# Patient Record
Sex: MALE | Race: AMERICAN INDIAN OR ALASKA NATIVE
[De-identification: names, ages, dates, MRNs, and addresses within clinical notes are randomized per-mention and may not be internally consistent; named-entity substitution may affect disease eponyms.]

---

## 2019-12-07 ENCOUNTER — HOSPITAL ENCOUNTER (EMERGENCY)
Dept: HOSPITAL 76 - ED | Age: 34
Discharge: HOME | End: 2019-12-07
Payer: COMMERCIAL

## 2019-12-07 VITALS — SYSTOLIC BLOOD PRESSURE: 136 MMHG | DIASTOLIC BLOOD PRESSURE: 89 MMHG

## 2019-12-07 DIAGNOSIS — K29.00: Primary | ICD-10-CM

## 2019-12-07 DIAGNOSIS — R79.89: ICD-10-CM

## 2019-12-07 LAB
ALBUMIN DIAFP-MCNC: 5.3 G/DL (ref 3.2–5.5)
ALBUMIN/GLOB SERPL: 1.6 {RATIO} (ref 1–2.2)
ALP SERPL-CCNC: 114 IU/L (ref 42–121)
ALT SERPL W P-5'-P-CCNC: 16 IU/L (ref 10–60)
ANION GAP SERPL CALCULATED.4IONS-SCNC: 10 MMOL/L (ref 6–13)
AST SERPL W P-5'-P-CCNC: 17 IU/L (ref 10–42)
BASOPHILS NFR BLD AUTO: 0.1 10^3/UL (ref 0–0.1)
BASOPHILS NFR BLD AUTO: 0.9 %
BILIRUB BLD-MCNC: 3.3 MG/DL (ref 0.2–1)
BILIRUB BLD-MCNC: 3.4 MG/DL (ref 0.2–1)
BILIRUB DIRECT SERPL-MCNC: 0.2 MG/DL (ref 0.1–0.5)
BUN SERPL-MCNC: 9 MG/DL (ref 6–20)
CALCIUM UR-MCNC: 9.9 MG/DL (ref 8.5–10.3)
CHLORIDE SERPL-SCNC: 99 MMOL/L (ref 101–111)
CLARITY UR REFRACT.AUTO: CLEAR
CO2 SERPL-SCNC: 29 MMOL/L (ref 21–32)
CREAT SERPLBLD-SCNC: 1.1 MG/DL (ref 0.6–1.2)
EOSINOPHIL # BLD AUTO: 0 10^3/UL (ref 0–0.7)
EOSINOPHIL NFR BLD AUTO: 0.3 %
ERYTHROCYTE [DISTWIDTH] IN BLOOD BY AUTOMATED COUNT: 12 % (ref 12–15)
GFRSERPLBLD MDRD-ARVRAT: 77 ML/MIN/{1.73_M2} (ref 89–?)
GLOBULIN SER-MCNC: 3.3 G/DL (ref 2.1–4.2)
GLUCOSE SERPL-MCNC: 91 MG/DL (ref 70–100)
GLUCOSE UR QL STRIP.AUTO: NEGATIVE MG/DL
HGB UR QL STRIP: 14.7 G/DL (ref 14–18)
KETONES UR QL STRIP.AUTO: NEGATIVE MG/DL
LIPASE SERPL-CCNC: 32 U/L (ref 22–51)
LYMPHOCYTES # SPEC AUTO: 1.6 10^3/UL (ref 1.5–3.5)
LYMPHOCYTES NFR BLD AUTO: 24.3 %
MCH RBC QN AUTO: 29.1 PG (ref 27–31)
MCHC RBC AUTO-ENTMCNC: 34.1 G/DL (ref 32–36)
MCV RBC AUTO: 85.2 FL (ref 80–94)
MONOCYTES # BLD AUTO: 0.5 10^3/UL (ref 0–1)
MONOCYTES NFR BLD AUTO: 7.9 %
NEUTROPHILS # BLD AUTO: 4.4 10^3/UL (ref 1.5–6.6)
NEUTROPHILS # SNV AUTO: 6.7 X10^3/UL (ref 4.8–10.8)
NEUTROPHILS NFR BLD AUTO: 66.3 %
NITRITE UR QL STRIP.AUTO: NEGATIVE
PDW BLD AUTO: 8.9 FL (ref 7.4–11.4)
PH UR STRIP.AUTO: 6.5 PH (ref 5–7.5)
PLATELET # BLD: 231 10^3/UL (ref 130–450)
PROT SPEC-MCNC: 8.6 G/DL (ref 6.7–8.2)
PROT UR STRIP.AUTO-MCNC: NEGATIVE MG/DL
RBC # UR STRIP.AUTO: NEGATIVE /UL
RBC MAR: 5.06 10^6/UL (ref 4.7–6.1)
SODIUM SERPLBLD-SCNC: 138 MMOL/L (ref 135–145)
SP GR UR STRIP.AUTO: <=1.005 (ref 1–1.03)
UROBILINOGEN UR QL STRIP.AUTO: (no result) E.U./DL
UROBILINOGEN UR STRIP.AUTO-MCNC: NEGATIVE MG/DL

## 2019-12-07 PROCEDURE — 85025 COMPLETE CBC W/AUTO DIFF WBC: CPT

## 2019-12-07 PROCEDURE — 80053 COMPREHEN METABOLIC PANEL: CPT

## 2019-12-07 PROCEDURE — 82248 BILIRUBIN DIRECT: CPT

## 2019-12-07 PROCEDURE — 81001 URINALYSIS AUTO W/SCOPE: CPT

## 2019-12-07 PROCEDURE — 76705 ECHO EXAM OF ABDOMEN: CPT

## 2019-12-07 PROCEDURE — 36415 COLL VENOUS BLD VENIPUNCTURE: CPT

## 2019-12-07 PROCEDURE — 83690 ASSAY OF LIPASE: CPT

## 2019-12-07 PROCEDURE — 81003 URINALYSIS AUTO W/O SCOPE: CPT

## 2019-12-07 PROCEDURE — 82247 BILIRUBIN TOTAL: CPT

## 2019-12-07 PROCEDURE — 87086 URINE CULTURE/COLONY COUNT: CPT

## 2019-12-07 PROCEDURE — 99284 EMERGENCY DEPT VISIT MOD MDM: CPT

## 2019-12-07 NOTE — ED PHYSICIAN DOCUMENTATION
PD HPI ABD PAIN





- Stated complaint


Stated Complaint: SIDE/ABD/BACK PX





- Chief complaint


Chief Complaint: Abd Pain





- History obtained from


History obtained from: Patient





- History of Present Illness


Timing - onset: How many days ago


Timing - duration: Days (Patient has had a week or 2 of intermittent pains with 

eating in the right upper quadrant and epigastric area.  This been more 

consistent over the last 3 to 4 days.  He was seen by his primary care and 

prescribed pantoprazole which he is taken for 3 days now.  He has not noticed an

improvement.  The pain is in the right upper quadrant and epigastric area and 

worse with eating.  He has not tried other medicines.  He has noticed some light

brown and floating stools.  He has changed to diet for more fruits and vegetable

s and higher fiber and away from greasy and fatty foods over the last week.  He 

has noticed a little bit of weight loss over the week as well.  He has not had 

any blood or melena in his stool.)


Quality: Cramping, Aching


Location: RUQ, Epigastric


Radiation: Right flank


Improved by: Laying still.  No: BM


Worsened by: Eating, Moving, Palpation.  No: Breathing


Associated symptoms: Nausea, Loss of appetite, Weight loss.  No: Fever, 

Vomiting, Diarrhea, Constipation, Melena, Dysuria


Similar symptoms before: Has not had sx before


Recently seen: Clinic (several days ago with Rx Pantoprazole.)





Review of Systems


Constitutional: reports: Fatigue.  denies: Fever, Chills, Myalgias


Nose: denies: Rhinorrhea / runny nose, Congestion


Throat: denies: Sore throat


Respiratory: denies: Dyspnea


GI: reports: Abdominal Pain, Nausea.  denies: Abdominal Swelling, Vomiting, D

iarrhea


: denies: Dysuria, Frequency


Musculoskeletal: denies: Neck pain


Neurologic: denies: Generalized weakness, Focal weakness, Numbness





PD PAST MEDICAL HISTORY





- Past Medical History


Cardiovascular: None


Respiratory: None


Neuro: None


GI: None





- Present Medications


Home Medications: 


                                Ambulatory Orders











 Medication  Instructions  Recorded  Confirmed


 


Lidocaine Viscous 2% [Xylocaine 5 ml PO Q4H PRN #100 ml 12/07/19 





Viscous 2%]   


 


Sucralfate [Carafate] 1 gm PO ACHS #60 tablet 12/07/19 














- Allergies


Allergies/Adverse Reactions: 


                                    Allergies











Allergy/AdvReac Type Severity Reaction Status Date / Time


 


No Known Drug Allergies Allergy   Verified 12/07/19 12:16














PD ED PE NORMAL





- Vitals


Vital signs reviewed: Yes





- General


General: Alert and oriented X 3, No acute distress, Well developed/nourished





- HEENT


HEENT: PERRL (nonicteric), Moist mucous membranes, Pharynx benign





- Neck


Neck: Supple, no meningeal sign, No adenopathy





- Cardiac


Cardiac: RRR, No murmur





- Respiratory


Respiratory: Clear bilaterally





- Abdomen


Abdomen: Normal bowel sounds, Soft, Non distended, No organomegaly, Other 

(Tender in the epigastric and right upper quadrant area with mild guarding.  

There is no percussion or rebound tenderness.  There is no referred tenderness 

from the rest of the abdomen.  There is no CVA tenderness.  Bowel sounds are 

present and normally active.  He does not hurt more with deep breathing.)





Results





- Vitals


Vitals: 


                               Vital Signs - 24 hr











  12/07/19 12/07/19





  12:16 16:26


 


Temperature 36.8 C 36.5 C


 


Heart Rate 85 58 L


 


Respiratory 16 12





Rate  


 


Blood Pressure 127/66 136/89 H


 


O2 Saturation 100 99








                                     Oxygen











O2 Source                      Room air

















- Labs


Labs: 


                                Laboratory Tests











  12/07/19 12/07/19 12/07/19





  12:31 12:50 12:50


 


WBC   6.7 


 


RBC   5.06 


 


Hgb   14.7 


 


Hct   43.1 


 


MCV   85.2 


 


MCH   29.1 


 


MCHC   34.1 


 


RDW   12.0 


 


Plt Count   231 


 


MPV   8.9 


 


Neut # (Auto)   4.4 


 


Lymph # (Auto)   1.6 


 


Mono # (Auto)   0.5 


 


Eos # (Auto)   0.0 


 


Baso # (Auto)   0.1 


 


Absolute Nucleated RBC   0.00 


 


Nucleated RBC %   0.0 


 


Sodium    138


 


Potassium    4.0


 


Chloride    99 L


 


Carbon Dioxide    29


 


Anion Gap    10.0


 


BUN    9


 


Creatinine    1.1


 


Estimated GFR (MDRD)    77 L


 


Glucose    91


 


Calcium    9.9


 


Total Bilirubin    3.3 H


 


Direct Bilirubin   


 


Indirect Bilirubin   


 


AST    17


 


ALT    16


 


Alkaline Phosphatase    114


 


Total Protein    8.6 H


 


Albumin    5.3


 


Globulin    3.3


 


Albumin/Globulin Ratio    1.6


 


Lipase    32


 


Urine Color  LIGHT YELLOW  


 


Urine Clarity  CLEAR  


 


Urine pH  6.5  


 


Ur Specific Gravity  <=1.005  


 


Urine Protein  NEGATIVE  


 


Urine Glucose (UA)  NEGATIVE  


 


Urine Ketones  NEGATIVE  


 


Urine Occult Blood  NEGATIVE  


 


Urine Nitrite  NEGATIVE  


 


Urine Bilirubin  NEGATIVE  


 


Urine Urobilinogen  0.2 (NORMAL)  


 


Ur Leukocyte Esterase  NEGATIVE  


 


Ur Microscopic Review  NOT INDICATED  


 


Urine Culture Comments  NOT INDICATED  














  12/07/19





  15:00


 


WBC 


 


RBC 


 


Hgb 


 


Hct 


 


MCV 


 


MCH 


 


MCHC 


 


RDW 


 


Plt Count 


 


MPV 


 


Neut # (Auto) 


 


Lymph # (Auto) 


 


Mono # (Auto) 


 


Eos # (Auto) 


 


Baso # (Auto) 


 


Absolute Nucleated RBC 


 


Nucleated RBC % 


 


Sodium 


 


Potassium 


 


Chloride 


 


Carbon Dioxide 


 


Anion Gap 


 


BUN 


 


Creatinine 


 


Estimated GFR (MDRD) 


 


Glucose 


 


Calcium 


 


Total Bilirubin  3.4 H


 


Direct Bilirubin  0.2


 


Indirect Bilirubin  3.2


 


AST 


 


ALT 


 


Alkaline Phosphatase 


 


Total Protein 


 


Albumin 


 


Globulin 


 


Albumin/Globulin Ratio 


 


Lipase 


 


Urine Color 


 


Urine Clarity 


 


Urine pH 


 


Ur Specific Gravity 


 


Urine Protein 


 


Urine Glucose (UA) 


 


Urine Ketones 


 


Urine Occult Blood 


 


Urine Nitrite 


 


Urine Bilirubin 


 


Urine Urobilinogen 


 


Ur Leukocyte Esterase 


 


Ur Microscopic Review 


 


Urine Culture Comments 














- Rads (name of study)


  ** RUQ abd U/S


Radiology: Prelim report reviewed, See rad report





PD MEDICAL DECISION MAKING





- ED course


Complexity details: reviewed results, considered differential (Initially being 

treated for ulcer/gastritis with pantoprazole.  However he is continued to have 

pain with eating so consider pancreatic or bile duct or gallbladder as well.  We

 will get labs and ultrasound to look for other problems.), d/w patient





Departure





- Departure


Disposition: 01 Home, Self Care


Clinical Impression: 


 Epigastric abdominal pain, Elevated bilirubin





Gastritis


Qualifiers:


 Gastritis type: unspecified gastritis Chronicity: acute Gastritis bleeding: 

without bleeding Qualified Code(s): K29.00 - Acute gastritis without bleeding





Condition: Stable


Record reviewed to determine appropriate education?: Yes


Instructions:  ED PUD Vs Gastritis


Follow-Up: 


[facility] [Provider Group]


Prescriptions: 


Lidocaine Viscous 2% [Xylocaine Viscous 2%] 5 ml PO Q4H PRN #100 ml


 PRN Reason: Pain


Sucralfate [Carafate] 1 gm PO ACHS #60 tablet


Comments: 


I made copies of your blood tests and ultrasound report.  He did have an 

isolated elevated unconjugated bilirubin.  This would possibly suggest an 

inherited metabolic problem though you can get it rechecked again to see if it 

is coming down okay.  The rest of your liver enzymes and pancreatic enzymes are 

normal.  Your ultrasound did not show any acute inflammatory process in the bile

 duct pancreatic duct or gallbladder.





At this point I would assume some inflammation of the stomach and duodenum.  

Continue the pantoprazole you have been prescribed.  Add sucralfate to coat the 

stomach and duodenum.  To that add antacid such as Maalox or Mylanta combined 

with lidocaine to help with stomach pains.  Continue more low-fat and spicy 

foods as you have been doing.  





Follow-up with your primary care later next week for recheck, call Monday for an

 appointment.


Discharge Date/Time: 12/07/19 16:35

## 2019-12-07 NOTE — ULTRASOUND REPORT
Reason:  RUQ pain with eating for weeks

Procedure Date:  12/07/2019   

Accession Number:  [id number] / [id number]                    

Procedure:  US  - Abdomen Limited CPT Code:  

 

***Final Report***

 

 

FULL RESULT:

 

 

EXAM:

ABDOMEN ULTRASOUND LIMITED, RUQ

 

EXAM DATE: 12/7/2019 02:38 PM.

 

CLINICAL HISTORY: Postprandial right upper quadrant pain for 2 weeks but 

worse today.

 

COMPARISON: None.

 

TECHNIQUE: Real-time scanning was performed with static images obtained.

 

FINDINGS:

 

LIVER: Normal in size but increased in echogenicity. There are a few tiny 

echogenic foci is scattered throughout the liver. No suspicious hepatic 

masses identified. The liver surface is smooth.

 

PORTAL VEIN: Patent with hepatopedal flow.

 

BILIARY: The common bile duct is normal in caliber. No intrahepatic 

biliary dilatation.

 

GALLBLADDER: Question sludge in the gallbladder. No gallstones, 

gallbladder wall thickening or pericholecystic fluid.

 

RIGHT KIDNEY: Normal in size without hydronephrosis, large shadowing 

stones or perinephric fluid collections.

 

PANCREAS: The visualized portions of the pancreas are unremarkable.

 

IVC: The visualized portions of the inferior vena cava are unremarkable.

 

AORTA: The visualized aorta is normal in caliber.

 

ASCITES: No significant free fluid.

 

 

Measurements:

Liver: 15 cm

 

Gallbladder wall thickness: 2 mm

 

Common bile duct: 3 mm

 

Right kidney: 11.4 cm

IMPRESSION:

 

1. No gallstones. No ultrasound evidence of acute cholecystitis.

 

2. Echogenic liver with several scattered tiny echogenic foci. Findings 

are nonspecific. Differential includes fatty infiltration versus medical 

liver disease/hepatitis. Recommend clinical correlation.

 

 

[facility]

## 2022-09-21 ENCOUNTER — HOSPITAL ENCOUNTER (EMERGENCY)
Age: 37
Discharge: HOME | End: 2022-09-21
Attending: EMERGENCY MEDICINE
Payer: OTHER GOVERNMENT

## 2022-09-21 ENCOUNTER — APPOINTMENT (OUTPATIENT)
Dept: CT IMAGING | Age: 37
End: 2022-09-21
Attending: PHYSICIAN ASSISTANT
Payer: OTHER GOVERNMENT

## 2022-09-21 VITALS
OXYGEN SATURATION: 99 % | RESPIRATION RATE: 18 BRPM | TEMPERATURE: 97.3 F | WEIGHT: 175 LBS | SYSTOLIC BLOOD PRESSURE: 145 MMHG | HEART RATE: 77 BPM | DIASTOLIC BLOOD PRESSURE: 89 MMHG | HEIGHT: 69 IN | BODY MASS INDEX: 25.92 KG/M2

## 2022-09-21 DIAGNOSIS — N30.00 ACUTE CYSTITIS WITHOUT HEMATURIA: ICD-10-CM

## 2022-09-21 DIAGNOSIS — R10.9 FLANK PAIN: Primary | ICD-10-CM

## 2022-09-21 LAB
APPEARANCE UR: CLEAR
BACTERIA URNS QL MICRO: ABNORMAL /HPF
BILIRUB UR QL: NEGATIVE
COLOR UR: YELLOW
GLUCOSE UR STRIP.AUTO-MCNC: NEGATIVE MG/DL
HGB UR QL STRIP: NEGATIVE
KETONES UR QL STRIP.AUTO: NEGATIVE MG/DL
LEUKOCYTE ESTERASE UR QL STRIP.AUTO: ABNORMAL
NITRITE UR QL STRIP.AUTO: NEGATIVE
PH UR STRIP: 6 [PH] (ref 5–8)
PROT UR STRIP-MCNC: NEGATIVE MG/DL
RBC #/AREA URNS HPF: ABNORMAL /HPF (ref 0–5)
SP GR UR REFRACTOMETRY: 1.01 (ref 1–1.03)
UROBILINOGEN UR QL STRIP.AUTO: 1 EU/DL (ref 0.2–1)
WBC URNS QL MICRO: ABNORMAL /HPF (ref 0–5)

## 2022-09-21 PROCEDURE — 74176 CT ABD & PELVIS W/O CONTRAST: CPT

## 2022-09-21 PROCEDURE — 87086 URINE CULTURE/COLONY COUNT: CPT

## 2022-09-21 PROCEDURE — 99284 EMERGENCY DEPT VISIT MOD MDM: CPT

## 2022-09-21 PROCEDURE — 81001 URINALYSIS AUTO W/SCOPE: CPT

## 2022-09-21 RX ORDER — CIPROFLOXACIN 500 MG/1
500 TABLET ORAL 2 TIMES DAILY
Qty: 14 TABLET | Refills: 0 | Status: SHIPPED | OUTPATIENT
Start: 2022-09-21 | End: 2022-09-28

## 2022-09-21 NOTE — ED TRIAGE NOTES
Pt arrives ambulatory to ED with c\o possible kidney infx, pt [facility] and given abx with no relief

## 2022-09-21 NOTE — DISCHARGE INSTRUCTIONS
Urine culture sent  Push liquids.   Keep well-hydrated  Stop current antibiotic  New antibiotic as prescribed  Tylenol/Motrin for pain  Follow-up with urology

## 2022-09-22 LAB
BACTERIA SPEC CULT: NORMAL
SERVICE CMNT-IMP: NORMAL

## 2022-09-22 NOTE — ED PROVIDER NOTES
EMERGENCY DEPARTMENT HISTORY AND PHYSICAL EXAM    Date: 9/21/2022  Patient Name: Ino Dean    History of Presenting Illness       Chief Complaint   Patient presents with    Back Pain       History Provided By: Patient    Additional History (Context):   Ino Dean is a 39 y.o. male who presents to the emergency room with c/o possible kidney infection. C/o right flank pain and right sided abdominal pain. Symptoms intermittent for 1 month. Recently seen at [facility] and diagnosed with UTI. Placed on Keflex. Since starting ABX treatment, pain worsened which prompted ER visit. No history of kidney stones, infection. Wife recently treated for UTI. Afebrile. No N/V. Pain achy pain that waxes and wanes. Denies scrotal or penile pain. PCP: Other, None, MD    Current Outpatient Medications   Medication Sig Dispense Refill    ciprofloxacin HCl (Cipro) 500 mg tablet Take 1 Tablet by mouth two (2) times a day for 7 days. 14 Tablet 0       Past History     Past Medical History:  No past medical history on file. Past Surgical History:  No past surgical history on file. Family History:  No family history on file. Social History: Allergies:  No Known Allergies      Review of Systems   Review of Systems   Constitutional:  Negative for chills and fever. Respiratory:  Negative for shortness of breath. Cardiovascular:  Negative for chest pain. Gastrointestinal:  Positive for abdominal pain. Negative for constipation, diarrhea, nausea and vomiting. Genitourinary:  Positive for dysuria and flank pain. Negative for decreased urine volume, frequency, hematuria, penile discharge, penile pain, penile swelling, scrotal swelling, testicular pain and urgency. Musculoskeletal:  Positive for back pain. Skin:  Negative for rash. Neurological:  Negative for dizziness, light-headedness and headaches. All other systems reviewed and are negative.     Physical Exam     Vitals:    09/21/22 1620   BP: (!) 145/89 Pulse: 77   Resp: 18   Temp: 97.3 °F (36.3 °C)   SpO2: 99%   Weight: 79.4 kg (175 lb)   Height: 5' 9\" (1.753 m)     Physical Exam  Vitals and nursing note reviewed. Constitutional:       General: He is not in acute distress. Appearance: Normal appearance. He is normal weight. He is not diaphoretic. HENT:      Mouth/Throat:      Mouth: Mucous membranes are moist.      Pharynx: Oropharynx is clear. Eyes:      General: No scleral icterus. Extraocular Movements: Extraocular movements intact. Conjunctiva/sclera: Conjunctivae normal.      Pupils: Pupils are equal, round, and reactive to light. Cardiovascular:      Rate and Rhythm: Normal rate and regular rhythm. Heart sounds: Normal heart sounds. Pulmonary:      Effort: Pulmonary effort is normal.      Breath sounds: Normal breath sounds. Abdominal:      General: Abdomen is flat. Bowel sounds are normal.      Palpations: Abdomen is soft. Tenderness: There is abdominal tenderness. There is right CVA tenderness. There is no left CVA tenderness, guarding or rebound. Negative signs include Dow's sign and McBurney's sign. Comments: Increased tenderness noted to the right middle quadrant of the abdomen with extension laterally and posteriorly toward the right flank. Remainder of the abdomen NT   Musculoskeletal:      Cervical back: Neck supple. Skin:     General: Skin is warm and dry. Neurological:      Mental Status: He is alert and oriented to person, place, and time.        Nursing note and vitals reviewed      Diagnostic Study Results     Labs -     Recent Results (from the past 24 hour(s))   URINALYSIS W/ RFLX MICROSCOPIC    Collection Time: 09/21/22  4:30 PM   Result Value Ref Range    Color YELLOW      Appearance CLEAR      Specific gravity 1.011 1.005 - 1.030      pH (UA) 6.0 5.0 - 8.0      Protein Negative NEG mg/dL    Glucose Negative NEG mg/dL    Ketone Negative NEG mg/dL    Bilirubin Negative NEG      Blood Negative NEG      Urobilinogen 1.0 0.2 - 1.0 EU/dL    Nitrites Negative NEG      Leukocyte Esterase SMALL (A) NEG     URINE MICROSCOPIC ONLY    Collection Time: 09/21/22  4:30 PM   Result Value Ref Range    WBC 4 to 10 0 - 5 /hpf    RBC 0 to 3 0 - 5 /hpf    Bacteria FEW (A) NEG /hpf       Radiologic Studies   CT ABD PELV WO CONT   Final Result      1. No findings of an acute abdominal intrapelvic obstructive or inflammatory   process. No findings to explain the patient's presenting history. 2. Prominent amount of stool in the rectal vault, without stranding and favoring   physiologic retention. 3. Splenomegaly. 4. Small fat-containing umbilical hernia. CT Results  (Last 48 hours)                 09/21/22 1751  CT ABD PELV WO CONT Final result    Impression:      1. No findings of an acute abdominal intrapelvic obstructive or inflammatory   process. No findings to explain the patient's presenting history. 2. Prominent amount of stool in the rectal vault, without stranding and favoring   physiologic retention. 3. Splenomegaly. 4. Small fat-containing umbilical hernia. Narrative:  EXAM: CT ABD PELV WO CONT       CLINICAL INDICATION/HISTORY: flank pain ? stone   -Additional: None       COMPARISON: None       TECHNIQUE: Axial CT imaging of the abdomen and pelvis was performed without   intravenous contrast. Multiplanar reformats were generated. One or more dose   reduction techniques were used on this CT: automated exposure control,   adjustment of the mAs and/or kVp according to patient size, and iterative   reconstruction techniques. The specific techniques used on this CT exam have   been documented in the patient's electronic medical record.  Digital Imaging and   Communications in Medicine (DICOM) format image data are available to   nonaffiliated external healthcare facilities or entities on a secure, media   free, reciprocally searchable basis with patient authorization for at least a   12-month period after this study. _______________       FINDINGS:       LOWER CHEST: Unremarkable. LIVER, BILIARY: Liver is unremarkable. No biliary dilation. Gallbladder is   unremarkable. PANCREAS: Normal.       SPLEEN: Splenomegaly measuring 15.5 cm in AP dimension with scattered coarse   calcified granuloma. .       ADRENALS: Normal.       KIDNEYS, URETERS, BLADDER: Isoattenuating bilateral kidneys without   hydronephrosis, perinephric fluid or induration. No nephrolithiasis. No   hydroureter or ureterolithiasis. Unremarkable suboptimal distended bladder       PELVIC ORGANS: Unremarkable. GASTROINTESTINAL TRACT: No bowel dilation or wall thickening. Normal appendix. Prominent amount of stool in the rectal vault without perirectal stranding. LYMPH NODES: No enlarged lymph nodes. VASCULATURE: Unremarkable. OSSEOUS: No acute or aggressive osseous abnormalities identified. Mild broad   posterior disc bulge at L4-5, with thecal sac impression and mild stenosis. OTHER: Small fat-containing umbilical hernia.       _______________                 CXR Results  (Last 48 hours)      None              Medical Decision Making   I am the first provider for this patient. I reviewed the vital signs, available nursing notes, past medical history, past surgical history, family history and social history. Vital Signs-Reviewed the patient's vital signs. Records Reviewed: Nursing Notes    DDX: UTI, pyelonephritis, renal colic / stone, obstructive uropathy, MS, GI    Procedures:  Procedures    ED Course:   Initial assessment performed. The patients presenting problems have been discussed, and they are in agreement with the care plan formulated and outlined with them. I have encouraged them to ask questions as they arise throughout their visit. ED Physician Discussion Note:   UA - small LE and few bacteria.  Culture sent  CT negative for stone   Will treat as UTI  Stop keflex, start Cipro  Referral to Urology  Leoan TORRES PA-C, am the primary clinician on record for this patient. Diagnosis and Disposition       DISCHARGE NOTE:  Dulce Rivero's  results have been reviewed with him. He has been counseled regarding his diagnosis, treatment, and plan. He verbally conveys understanding and agreement of the signs, symptoms, diagnosis, treatment and prognosis and additionally agrees to follow up as discussed. He also agrees with the care-plan and conveys that all of his questions have been answered. I have also provided discharge instructions for him that include: educational information regarding their diagnosis and treatment, and list of reasons why they would want to return to the ED prior to their follow-up appointment, should his condition change. He has been provided with education for proper emergency department utilization. CLINICAL IMPRESSION:    1. Flank pain    2. Acute cystitis without hematuria        PLAN:  1. D/C Home  2. Discharge Medication List as of 9/21/2022  6:43 PM        3. Follow-up Information       Follow up With Specialties Details Why Bertin Nunez MD Urology Call  Call urology for follow-up care [address] [address]  [phone]      [facility] EMERGENCY DEPT Emergency Medicine  If symptoms worsen, As needed [address] [place] [id number] [phone]          ____________________________________     Please note that this dictation was completed with [employer], the computer voice recognition software. Quite often unanticipated grammatical, syntax, homophones, and other interpretive errors are inadvertently transcribed by the computer software. Please disregard these errors. Please excuse any errors that have escaped final proofreading.

## 2024-12-18 ENCOUNTER — HOSPITAL ENCOUNTER (OUTPATIENT)
Facility: HOSPITAL | Age: 39
Setting detail: RECURRING SERIES
Discharge: HOME | End: 2024-12-21
Payer: OTHER GOVERNMENT

## 2024-12-18 PROCEDURE — 97161 PT EVAL LOW COMPLEX 20 MIN: CPT

## 2024-12-18 PROCEDURE — 97535 SELF CARE MNGMENT TRAINING: CPT

## 2024-12-18 PROCEDURE — 97530 THERAPEUTIC ACTIVITIES: CPT

## 2024-12-18 NOTE — PROGRESS NOTES
PHYSICAL THERAPY EVALUATION / DAILY TREATMENT      Patient Name: Darrell Bustillo    Date: 2024    : 1985  Insurance: Payor:  EAST / Plan: [employer] PRIME / Product Type: *No Product type* /      Patient  verified yes     Visit #   Current / Total 1 8   Time   In / Out 9:10 9:40   Pain   In / Out 4 4     TREATMENT AREA =  Pain in right knee [M25.561]    If an interpreting service is utilized for treatment of this patient, the contents of this document represent the material reviewed with the patient via the .     SUBJECTIVE:  Chief Complaint/Mechanism of Injury:   Patient is a pleasant 38 y.o. male with c/o right knee pain and instability that started 2 years ago when he was playing basketball.  Patient states a week later a MRI showed a full tear of his ACL.  Patient states the pain and instability has continued over the last two years, so he's been working on gaining strength at the gym in his legs for the last 6 months.  Patient is with c/o pain and/or difficulty with anything that involves cutting, pivoting, lateral movement, running, and when doing single leg squats.  Patient states on occasion his right knee does buckle.  Patient denies any tingling, numbness, or burning, but he does report right hip pain and right ankle pain that started about a year ago.  Patient states he thinks this may be related to an altered gait pattern secondary to his right knee pain.  Patient states his doctor wants him to get his LE strength to \"90%\" before having surgery.  Currently there is no scheduled surgery date.  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  Patient enjoys golfing and he hopes to be able to return to playing basketball.    Pain Level (out of 0-10 scale): 6/10 pain at worst, but an average daily pain of 3-4/10  [x] constant, [] intermittent, [x] improving, [] worsening, [] no change since onset  Alleviating Factors: diet and exercise  Previous 
Management, and (Elective Self Pay) Needle Insertion w/o Injection (1 or 2 muscles), (3+ muscles)  Patient / Family readiness to learn indicated by: asking questions, trying to perform skills, interest, return verbalization , and return demonstration   Persons(s) to be included in education: patient (P)  Barriers to Learning/Limitations: None  Measures taken if barriers to learning present: n/a  Patient Self Reported Health Status: good  Rehabilitation Potential: good    Short Term Goals:    to be accomplished within 4 treatments:     Patient will be compliant and independent with prescribed HEP(s) in order to assist in maximizing therapeutic gains and improving overall function.  Eval: HEP to be issued and reviewed with patient within 1-2 visits     Patient will report at least a 25% reduction in pain/symptoms while performing functional activities in order to improve overall tolerance to functional movements and progress towards PLOF.  Eval: 6/10 pain at worst, but an average daily pain of 3-4/10        Long Term Goals:     to be accomplished within 8 treatments:     Patient will score 70/80 points or higher on Lower Extremity Functional Scale (LEFS) to meet MCID and show improvement with functional activities and ADL's.              Scoring:           MCID = 9 points                                              21-40 = moderate limitation                                      61-80 = minimal to no limitation                     0-20 = severe limitation                                      41-60 = mild to moderate limitation  Eval: 61/80 on LEFS      Patient will report an average daily pain of 2/10 or less during all functional activities in order to improve QOL and return to patient's PLOF.  Eval: 6/10 pain at worst, but an average daily pain of 3-4/10     Patient will demonstrate 5/5 gluteal strength bilaterally to improve lumbopelvic stabilization during dynamic functional activities.  Eval: bilateral hip

## 2025-01-07 ENCOUNTER — APPOINTMENT (OUTPATIENT)
Facility: HOSPITAL | Age: 40
End: 2025-01-07
Payer: OTHER GOVERNMENT

## 2025-01-10 ENCOUNTER — APPOINTMENT (OUTPATIENT)
Facility: HOSPITAL | Age: 40
End: 2025-01-10
Payer: OTHER GOVERNMENT

## 2025-01-10 ENCOUNTER — TELEPHONE (OUTPATIENT)
Facility: HOSPITAL | Age: 40
End: 2025-01-10

## 2025-01-14 ENCOUNTER — HOSPITAL ENCOUNTER (OUTPATIENT)
Facility: HOSPITAL | Age: 40
Setting detail: RECURRING SERIES
Discharge: HOME | End: 2025-01-17
Payer: OTHER GOVERNMENT

## 2025-01-14 PROCEDURE — 97112 NEUROMUSCULAR REEDUCATION: CPT

## 2025-01-14 PROCEDURE — 97110 THERAPEUTIC EXERCISES: CPT

## 2025-01-14 PROCEDURE — 97530 THERAPEUTIC ACTIVITIES: CPT

## 2025-01-14 NOTE — PROGRESS NOTES
reports partial compliance to HEP since last visit. 1/14/25      Patient will report at least a 25% reduction in pain/symptoms while performing functional activities in order to improve overall tolerance to functional movements and progress towards PLOF.  Eval: 6/10 pain at worst, but an average daily pain of 3-4/10    Current: no change 1/14/25      Long Term Goals:     to be accomplished within 8 treatments:     Patient will score 70/80 points or higher on Lower Extremity Functional Scale (LEFS) to meet MCID and show improvement with functional activities and ADL's.              Scoring:           MCID = 9 points                                              21-40 = moderate limitation                                      61-80 = minimal to no limitation                     0-20 = severe limitation                                      41-60 = mild to moderate limitation  Eval: 61/80 on LEFS      Patient will report an average daily pain of 2/10 or less during all functional activities in order to improve QOL and return to patient's PLOF.  Eval: 6/10 pain at worst, but an average daily pain of 3-4/10     Patient will demonstrate 5/5 gluteal strength bilaterally to improve lumbopelvic stabilization during dynamic functional activities.  Eval: bilateral hip abduction 4+/5, bilateral hip extension 4+/5    PLAN  Yes  Continue plan of care  []  Upgrade activities as tolerated  []  Discharge due to :  []  Other:    Prosper Palacios Jr, PTA    1/14/2025    10:40 AM    Future Appointments   Date Time Provider Department Center   1/14/2025 11:10 AM Philip Cheng Jr., [facility]   1/17/2025  9:10 AM Salbador Mo, [facility]   1/21/2025  9:10 AM Rajni Herring, PT [facility]

## 2025-01-17 ENCOUNTER — HOSPITAL ENCOUNTER (OUTPATIENT)
Facility: HOSPITAL | Age: 40
Setting detail: RECURRING SERIES
Discharge: HOME | End: 2025-01-20
Payer: OTHER GOVERNMENT

## 2025-01-17 PROCEDURE — 97530 THERAPEUTIC ACTIVITIES: CPT

## 2025-01-17 PROCEDURE — 97535 SELF CARE MNGMENT TRAINING: CPT

## 2025-01-17 NOTE — PROGRESS NOTES
PHYSICAL / OCCUPATIONAL THERAPY - DAILY TREATMENT NOTE     Patient Name: Darrell Bustillo    Date: 2025    : 1985  Insurance: Payor:  EAST / Plan:  EAST PRIME / Product Type: *No Product type* /      Patient  verified Yes     Visit #   Current / Total 3 15   Time   In / Out 9:10 9:33   Pain   In / Out 4/10 4/10   Subjective Functional Status/Changes: \"I have some pain in my Right Knee. I've been going to the gym regularly and working out.\"   Changes to:  Allergies, Med Hx, Sx Hx?   no       TREATMENT AREA =  Pain in right knee [M25.561]    If an interpreting service is utilized for treatment of this patient, the contents of this document represent the material reviewed with the patient via the .     OBJECTIVE  Therapeutic Procedures:  Tx Min Billable or 1:1 Min (if diff from Tx Min) Procedure, Rationale, Specifics     43715 Therapeutic Exercise (timed):  increase ROM, strength, coordination, balance, and proprioception to improve patient's ability to progress to PLOF and address remaining functional goals. (see flow sheet as applicable)    Details if applicable:         85202 Neuromuscular Re-Education (timed):  improve balance, coordination, kinesthetic sense, posture, core stability and proprioception to improve patient's ability to develop conscious control of individual muscles and awareness of position of extremities in order to progress to PLOF and address remaining functional goals. (see flow sheet as applicable)    Details if applicable:     10  51974 Therapeutic Activity (timed):  use of dynamic activities replicating functional movements to increase ROM, strength, coordination, balance, and proprioception in order to improve patient's ability to progress to PLOF and address remaining functional goals.  (see flow sheet as applicable)     Details if applicable:     13  92902 Self Care/Home Management (timed):  improve patient knowledge and understanding of home 
Physical Therapy Discharge Instructions    [facility]  [address] [place] [address]  Ph [phone]  Fx [phone]      Patient: Darrell Bustillo  : 1985      Continue Home Exercise Program 3 times per day for 3 weeks, then decrease to 3 times per week      Continue with    [x] Ice  as needed      [x] Heat           Follow up with MD:     [x] Upon completion of therapy     [] As needed      Recommendations:     [x]   Return to activity with home program    []   Return to activity with the following modifications:       []Post Rehab Program    []Join Independent aquatic program     []Return to/join local gym        Additional Comments: Follow up with MD for continued POC          Salbador Mo PTA 2025 10:22 AM  
2.37

## 2025-01-21 ENCOUNTER — APPOINTMENT (OUTPATIENT)
Facility: HOSPITAL | Age: 40
End: 2025-01-21
Payer: OTHER GOVERNMENT

## 2025-06-02 ENCOUNTER — TELEPHONE (OUTPATIENT)
Facility: HOSPITAL | Age: 40
End: 2025-06-02

## 2025-06-02 NOTE — TELEPHONE ENCOUNTER
Pt called to r/s appt due to having another conflicting appt at exactly 12p, pt moved appt up to 840a w/ same provider

## 2025-06-03 ENCOUNTER — HOSPITAL ENCOUNTER (OUTPATIENT)
Facility: HOSPITAL | Age: 40
Setting detail: RECURRING SERIES
Discharge: HOME | End: 2025-06-06
Payer: OTHER GOVERNMENT

## 2025-06-03 PROCEDURE — 97112 NEUROMUSCULAR REEDUCATION: CPT

## 2025-06-03 PROCEDURE — 97161 PT EVAL LOW COMPLEX 20 MIN: CPT

## 2025-06-03 NOTE — PROGRESS NOTES
[facility] at [facility]  [address] [place] [address]  Phone: [phone]  Fax: [phone]    Plan of Care/ Statement of Necessity for Physical Therapy Services      Patient name: Darrell Bustillo Start of Care: 6/3/2025   Referral source: David Yousif MD : 1985    Medical Diagnosis: Sciatica, right side  Other low back pain Onset Date:2015    Treatment Diagnosis:  M54.59, G89.29  CHRONIC LOWER BACK PAIN and M54.31  SCIATICA, RIGHT SIDE                                          Prior Hospitalization: see medical history Provider#: [id number]     Time In/Out: 9:07-9:50 Visit Count: 1/15     Comorbidities: Musculoskeletal disorders and Social determinants of health: Alcohol and Depression    Prior Level of Function: functionally independent, no AD, active lifestyle    ASSESSMENT/Changes in Function:   Patient is a very pleasant 40 y/o male with reports of chronic LBP that's been ongoing for 10+ years.  Patient works for the [employer] and states he very frequently has to bend forward to  heavy items, which tends to cause his LBP to increase as well as radicular symptoms down his right>leg in the L5-S1 dermatomal pattern.  Patient states bending over and sitting for longer periods tends to make his radicular symptoms worse, though he gets relief with laying down.  Patient also reports sit <> stands tend to cause him pain.  Patient reports when his symptoms are minimal he is able to jog or run, but he is not able to do this when his symptoms flare up.  Patient denies any changes in his bowel or bladder, though he does feel like both legs (right>leg) have gotten weaker over the last few years.  Patient has a known chronic right knee ACL tear and chronic ankle pain that he states does affect his gait at times.  Patient works full time for the [employer] and his hobbies include playing sports and working out.  Patient states he normally works out 3-4x/week, though he has

## 2025-06-06 ENCOUNTER — TELEPHONE (OUTPATIENT)
Facility: HOSPITAL | Age: 40
End: 2025-06-06

## 2025-06-06 NOTE — TELEPHONE ENCOUNTER
Pt no show appt. PTA called and left voicemail informing pt of NS/Cxl policy and confirmed pt's next appt.

## 2025-06-10 ENCOUNTER — HOSPITAL ENCOUNTER (OUTPATIENT)
Facility: HOSPITAL | Age: 40
Setting detail: RECURRING SERIES
Discharge: HOME | End: 2025-06-13
Payer: OTHER GOVERNMENT

## 2025-06-10 PROCEDURE — 97530 THERAPEUTIC ACTIVITIES: CPT

## 2025-06-10 PROCEDURE — 97110 THERAPEUTIC EXERCISES: CPT

## 2025-06-10 PROCEDURE — 97112 NEUROMUSCULAR REEDUCATION: CPT

## 2025-06-10 NOTE — PROGRESS NOTES
to waist to decrease his risk of injury during lifting activities.              Eval: not tested due to pain, will test at later date when appropriate    PLAN  Yes  Continue plan of care  []  Upgrade activities as tolerated  []  Discharge due to :  []  Other:    Davion Brown, PT    6/10/2025    8:54 AM    Future Appointments   Date Time Provider Department Center   6/10/2025  9:10 AM Davion Brown, PT [facility]   6/13/2025  9:10 AM Rajni Herring, PT [facility]   6/17/2025  9:50 AM Davion Brown, PT [facility]   6/19/2025  9:50 AM Rajni Herring, PT [facility]   6/24/2025  9:10 AM Rajni Herring, PT [facility]   6/27/2025  9:10 AM Rajni Herring, PT [facility]   6/30/2025  9:10 AM Davion Brown, PT [facility]   7/2/2025  9:10 AM Nano Quinonez, PT [facility]   7/8/2025  9:10 AM Rajni Herring, PT [facility]   7/11/2025  9:10 AM Rajni Herring, PT [facility]   7/15/2025  9:10 AM Rajni Herring, PT [facility]   7/18/2025  9:10 AM Rajni Herring, PT [facility]

## 2025-06-13 ENCOUNTER — HOSPITAL ENCOUNTER (OUTPATIENT)
Facility: HOSPITAL | Age: 40
Setting detail: RECURRING SERIES
Discharge: HOME | End: 2025-06-16
Payer: OTHER GOVERNMENT

## 2025-06-13 PROCEDURE — 97110 THERAPEUTIC EXERCISES: CPT

## 2025-06-13 PROCEDURE — 97530 THERAPEUTIC ACTIVITIES: CPT

## 2025-06-13 PROCEDURE — 97112 NEUROMUSCULAR REEDUCATION: CPT

## 2025-06-13 NOTE — PROGRESS NOTES
PHYSICAL / OCCUPATIONAL THERAPY - DAILY TREATMENT NOTE     Patient Name: Darrell Bustillo    Date: 2025    : 1985  Insurance: Payor:  EAST / Plan:  EAST PRIME / Product Type: *No Product type* /      Patient  verified Yes     Visit #   Current / Total 3 15   Time   In / Out 9:12 9:50   Pain   In / Out 4 3   Subjective Functional Status/Changes: Patient reports no particular relief in symptoms as of yet.   Changes to:  Allergies, Med Hx, Sx Hx?   no       TREATMENT AREA =  Sciatica, right side [M54.31]  Other low back pain [M54.59]    If an interpreting service is utilized for treatment of this patient, the contents of this document represent the material reviewed with the patient via the .     OBJECTIVE    Therapeutic Procedures:  Tx Min Billable or 1:1 Min (if diff from Tx Min) Procedure, Rationale, Specifics   10  86398 Therapeutic Exercise (timed):  increase ROM, strength, coordination, balance, and proprioception to improve patient's ability to progress to PLOF and address remaining functional goals. (see flow sheet as applicable)    Details if applicable:       12  82348 Therapeutic Activity (timed):  use of dynamic activities replicating functional movements to increase ROM, strength, coordination, balance, and proprioception in order to improve patient's ability to progress to PLOF and address remaining functional goals.  (see flow sheet as applicable)    Details if applicable:     16  64338 Neuromuscular Re-Education (timed):  improve balance, coordination, kinesthetic sense, posture, core stability and proprioception to improve patient's ability to develop conscious control of individual muscles and awareness of position of extremities in order to progress to PLOF and address remaining functional goals. (see flow sheet as applicable)     Details if applicable:     38  [facility] Totals Reminder: bill using total billable min of TIMED therapeutic procedures (example: do

## 2025-06-17 ENCOUNTER — TELEPHONE (OUTPATIENT)
Facility: HOSPITAL | Age: 40
End: 2025-06-17

## 2025-06-19 ENCOUNTER — TELEPHONE (OUTPATIENT)
Facility: HOSPITAL | Age: 40
End: 2025-06-19

## 2025-06-19 NOTE — TELEPHONE ENCOUNTER
NS/NC - 3 total n/s, 2 being in a row, spoke to pt and advised him of cxling rem appts per policy but that he may schedule day of but will d/c if not heard from by 7/3/25.  made aware

## 2025-06-24 ENCOUNTER — APPOINTMENT (OUTPATIENT)
Facility: HOSPITAL | Age: 40
End: 2025-06-24
Payer: OTHER GOVERNMENT

## 2025-06-27 ENCOUNTER — APPOINTMENT (OUTPATIENT)
Facility: HOSPITAL | Age: 40
End: 2025-06-27
Payer: OTHER GOVERNMENT

## 2025-06-30 ENCOUNTER — APPOINTMENT (OUTPATIENT)
Facility: HOSPITAL | Age: 40
End: 2025-06-30
Payer: OTHER GOVERNMENT